# Patient Record
Sex: FEMALE | NOT HISPANIC OR LATINO | URBAN - METROPOLITAN AREA
[De-identification: names, ages, dates, MRNs, and addresses within clinical notes are randomized per-mention and may not be internally consistent; named-entity substitution may affect disease eponyms.]

---

## 2023-01-01 ENCOUNTER — APPOINTMENT (OUTPATIENT)
Dept: PRIMARY CARE | Facility: CLINIC | Age: 68
End: 2023-01-01
Payer: MEDICARE

## 2023-01-01 ENCOUNTER — CLINICAL SUPPORT (OUTPATIENT)
Dept: PRIMARY CARE | Facility: CLINIC | Age: 68
End: 2023-01-01
Payer: MEDICARE

## 2023-01-01 ENCOUNTER — TELEPHONE (OUTPATIENT)
Dept: PRIMARY CARE | Facility: CLINIC | Age: 68
End: 2023-01-01
Payer: MEDICARE

## 2023-01-01 DIAGNOSIS — K21.9 GASTROESOPHAGEAL REFLUX DISEASE WITHOUT ESOPHAGITIS: Primary | ICD-10-CM

## 2023-01-01 DIAGNOSIS — I10 PRIMARY HYPERTENSION: ICD-10-CM

## 2023-01-01 DIAGNOSIS — E11.9 TYPE 2 DIABETES MELLITUS WITHOUT COMPLICATION, WITHOUT LONG-TERM CURRENT USE OF INSULIN (MULTI): Primary | ICD-10-CM

## 2023-01-01 DIAGNOSIS — F32.A DEPRESSION, UNSPECIFIED DEPRESSION TYPE: ICD-10-CM

## 2023-01-01 DIAGNOSIS — E11.9 TYPE 2 DIABETES MELLITUS WITHOUT COMPLICATION, UNSPECIFIED WHETHER LONG TERM INSULIN USE (MULTI): ICD-10-CM

## 2023-01-01 DIAGNOSIS — E78.2 MIXED HYPERLIPIDEMIA: ICD-10-CM

## 2023-01-01 DIAGNOSIS — F51.01 PRIMARY INSOMNIA: ICD-10-CM

## 2023-01-01 LAB — POC HEMOGLOBIN A1C: 5.7 % (ref 4.2–6.5)

## 2023-01-01 RX ORDER — METFORMIN HYDROCHLORIDE 500 MG/1
500 TABLET, EXTENDED RELEASE ORAL 2 TIMES DAILY
Qty: 180 TABLET | Refills: 1 | Status: SHIPPED | OUTPATIENT
Start: 2023-01-01 | End: 2024-01-01

## 2023-01-01 RX ORDER — FUROSEMIDE 20 MG/1
20 TABLET ORAL DAILY
COMMUNITY

## 2023-01-01 RX ORDER — ESOMEPRAZOLE MAGNESIUM 40 MG/1
40 CAPSULE, DELAYED RELEASE ORAL
Qty: 90 CAPSULE | Refills: 1 | Status: SHIPPED | OUTPATIENT
Start: 2023-01-01 | End: 2023-01-01 | Stop reason: WASHOUT

## 2023-01-01 RX ORDER — DULOXETIN HYDROCHLORIDE 60 MG/1
1 CAPSULE, DELAYED RELEASE ORAL DAILY
COMMUNITY
End: 2024-01-01

## 2023-01-01 RX ORDER — TOPIRAMATE 100 MG/1
1 TABLET, FILM COATED ORAL DAILY
COMMUNITY
End: 2024-01-01

## 2023-01-01 RX ORDER — LOSARTAN POTASSIUM 100 MG/1
100 TABLET ORAL DAILY
COMMUNITY
Start: 2015-03-09 | End: 2023-01-01

## 2023-01-01 RX ORDER — LOSARTAN POTASSIUM 100 MG/1
100 TABLET ORAL DAILY
Qty: 90 TABLET | Refills: 1 | Status: SHIPPED | OUTPATIENT
Start: 2023-01-01 | End: 2024-01-01

## 2023-01-01 RX ORDER — METFORMIN HYDROCHLORIDE 500 MG/1
500 TABLET, EXTENDED RELEASE ORAL 2 TIMES DAILY
COMMUNITY
Start: 2019-07-08 | End: 2023-01-01

## 2023-01-01 RX ORDER — ATORVASTATIN CALCIUM 20 MG/1
20 TABLET, FILM COATED ORAL DAILY
COMMUNITY
Start: 2015-09-25 | End: 2023-01-01

## 2023-01-01 RX ORDER — SUMATRIPTAN 50 MG/1
50 TABLET, FILM COATED ORAL 2 TIMES DAILY PRN
COMMUNITY

## 2023-01-01 RX ORDER — TRAZODONE HYDROCHLORIDE 50 MG/1
50 TABLET ORAL NIGHTLY PRN
Qty: 90 TABLET | Refills: 0 | Status: SHIPPED | OUTPATIENT
Start: 2023-01-01 | End: 2024-01-01

## 2023-01-01 RX ORDER — MAGNESIUM HYDROXIDE 400 MG/5ML
1 SUSPENSION, ORAL (FINAL DOSE FORM) ORAL DAILY
COMMUNITY

## 2023-01-01 RX ORDER — ESOMEPRAZOLE MAGNESIUM 40 MG/1
1 CAPSULE, DELAYED RELEASE ORAL
COMMUNITY
End: 2023-01-01 | Stop reason: SDUPTHER

## 2023-01-01 RX ORDER — TRAZODONE HYDROCHLORIDE 50 MG/1
50 TABLET ORAL NIGHTLY PRN
COMMUNITY
Start: 2022-11-02 | End: 2023-01-01 | Stop reason: SDUPTHER

## 2023-01-01 RX ORDER — ARIPIPRAZOLE 2 MG/1
2 TABLET ORAL EVERY 24 HOURS
COMMUNITY
Start: 2023-01-01 | End: 2023-01-01 | Stop reason: SDUPTHER

## 2023-01-01 RX ORDER — ZOLPIDEM TARTRATE 10 MG/1
10 TABLET ORAL NIGHTLY PRN
COMMUNITY
Start: 2010-10-28 | End: 2024-01-01 | Stop reason: ALTCHOICE

## 2023-01-01 RX ORDER — VERAPAMIL HYDROCHLORIDE 360 MG/1
360 CAPSULE, DELAYED RELEASE PELLETS ORAL EVERY MORNING
COMMUNITY
End: 2023-01-01

## 2023-01-01 RX ORDER — ATORVASTATIN CALCIUM 20 MG/1
20 TABLET, FILM COATED ORAL DAILY
Qty: 90 TABLET | Refills: 1 | Status: SHIPPED | OUTPATIENT
Start: 2023-01-01 | End: 2024-01-01

## 2023-01-01 RX ORDER — VERAPAMIL HYDROCHLORIDE 360 MG/1
360 CAPSULE, DELAYED RELEASE PELLETS ORAL EVERY MORNING
Qty: 90 CAPSULE | Refills: 1 | Status: SHIPPED | OUTPATIENT
Start: 2023-01-01 | End: 2024-01-01

## 2023-01-01 RX ORDER — DULOXETIN HYDROCHLORIDE 30 MG/1
1 CAPSULE, DELAYED RELEASE ORAL DAILY
COMMUNITY
Start: 2023-05-11 | End: 2024-01-01 | Stop reason: WASHOUT

## 2023-01-01 RX ORDER — ARIPIPRAZOLE 2 MG/1
2 TABLET ORAL DAILY
Qty: 90 TABLET | Refills: 0 | Status: SHIPPED | OUTPATIENT
Start: 2023-01-01 | End: 2024-01-01

## 2023-01-01 RX ORDER — ARIPIPRAZOLE 2 MG/1
2 TABLET ORAL EVERY 24 HOURS
Qty: 90 TABLET | Refills: 1 | Status: SHIPPED | OUTPATIENT
Start: 2023-01-01 | End: 2023-01-01

## 2023-01-01 RX ORDER — CELECOXIB 200 MG/1
200 CAPSULE ORAL DAILY PRN
COMMUNITY
Start: 2017-07-10 | End: 2024-01-01

## 2023-09-15 PROBLEM — R60.0 EDEMA OF LOWER EXTREMITY: Status: ACTIVE | Noted: 2023-01-01

## 2023-09-15 PROBLEM — E78.00 PURE HYPERCHOLESTEROLEMIA: Status: ACTIVE | Noted: 2023-01-01

## 2023-09-15 PROBLEM — G47.00 INSOMNIA: Status: ACTIVE | Noted: 2023-01-01

## 2023-09-15 PROBLEM — E84.8: Status: ACTIVE | Noted: 2023-01-01

## 2023-09-15 PROBLEM — N95.9 MENOPAUSAL AND POSTMENOPAUSAL DISORDER: Status: ACTIVE | Noted: 2023-01-01

## 2023-09-15 PROBLEM — K21.9 GASTROESOPHAGEAL REFLUX DISEASE: Status: ACTIVE | Noted: 2023-01-01

## 2023-09-15 PROBLEM — G43.009 MIGRAINE WITHOUT AURA AND WITHOUT STATUS MIGRAINOSUS, NOT INTRACTABLE: Status: ACTIVE | Noted: 2023-01-01

## 2023-09-15 PROBLEM — M19.90 DEGENERATIVE JOINT DISEASE: Status: ACTIVE | Noted: 2023-01-01

## 2023-09-15 PROBLEM — E03.9 HYPOTHYROIDISM: Status: ACTIVE | Noted: 2023-01-01

## 2023-09-15 PROBLEM — E66.01 MORBID OBESITY (MULTI): Status: ACTIVE | Noted: 2023-01-01

## 2023-09-15 PROBLEM — F41.8 MIXED ANXIETY DEPRESSIVE DISORDER: Status: ACTIVE | Noted: 2023-01-01

## 2023-09-15 PROBLEM — E11.9 TYPE 2 DIABETES MELLITUS WITHOUT COMPLICATION (MULTI): Status: ACTIVE | Noted: 2023-01-01

## 2023-09-15 PROBLEM — D50.9 IRON DEFICIENCY ANEMIA: Status: ACTIVE | Noted: 2023-01-01

## 2023-09-15 PROBLEM — E55.9 VITAMIN D DEFICIENCY: Status: ACTIVE | Noted: 2023-01-01

## 2023-09-15 PROBLEM — I10 ESSENTIAL HYPERTENSION: Status: ACTIVE | Noted: 2023-01-01

## 2023-09-15 PROBLEM — R53.82 CHRONIC FATIGUE: Status: ACTIVE | Noted: 2023-01-01

## 2023-09-15 PROBLEM — M79.7 FIBROMYALGIA: Status: ACTIVE | Noted: 2023-01-01

## 2023-09-15 PROBLEM — G47.30 SLEEP APNEA: Status: ACTIVE | Noted: 2023-01-01

## 2023-09-15 PROBLEM — L71.9 ROSACEA: Status: ACTIVE | Noted: 2023-01-01

## 2023-12-15 NOTE — TELEPHONE ENCOUNTER
From ans machine 12/15/23 @12:59pm:  Insurance is not covering Abilify 2 mg.  But they will approve the generic form Aripiprazole.  Please send rx to [facility] for this.

## 2024-01-01 ENCOUNTER — APPOINTMENT (OUTPATIENT)
Dept: CARDIOLOGY | Facility: HOSPITAL | Age: 69
End: 2024-01-01
Payer: MEDICARE

## 2024-01-01 ENCOUNTER — OFFICE VISIT (OUTPATIENT)
Dept: PRIMARY CARE | Facility: CLINIC | Age: 69
End: 2024-01-01
Payer: MEDICARE

## 2024-01-01 ENCOUNTER — APPOINTMENT (OUTPATIENT)
Dept: RADIOLOGY | Facility: HOSPITAL | Age: 69
End: 2024-01-01
Payer: MEDICARE

## 2024-01-01 ENCOUNTER — HOSPITAL ENCOUNTER (OUTPATIENT)
Dept: RADIOLOGY | Facility: CLINIC | Age: 69
Discharge: HOME | End: 2024-05-29
Payer: MEDICARE

## 2024-01-01 ENCOUNTER — LAB (OUTPATIENT)
Dept: LAB | Facility: LAB | Age: 69
End: 2024-01-01
Payer: MEDICARE

## 2024-01-01 ENCOUNTER — HOSPITAL ENCOUNTER (EMERGENCY)
Facility: HOSPITAL | Age: 69
End: 2024-06-01
Attending: STUDENT IN AN ORGANIZED HEALTH CARE EDUCATION/TRAINING PROGRAM
Payer: MEDICARE

## 2024-01-01 ENCOUNTER — PATIENT MESSAGE (OUTPATIENT)
Dept: PRIMARY CARE | Facility: CLINIC | Age: 69
End: 2024-01-01
Payer: MEDICARE

## 2024-01-01 VITALS
DIASTOLIC BLOOD PRESSURE: 81 MMHG | HEART RATE: 132 BPM | SYSTOLIC BLOOD PRESSURE: 105 MMHG | HEIGHT: 65 IN | RESPIRATION RATE: 20 BRPM | WEIGHT: 286.6 LBS | BODY MASS INDEX: 47.75 KG/M2

## 2024-01-01 VITALS
HEART RATE: 71 BPM | HEIGHT: 62 IN | WEIGHT: 271 LBS | OXYGEN SATURATION: 96 % | SYSTOLIC BLOOD PRESSURE: 124 MMHG | BODY MASS INDEX: 49.87 KG/M2 | DIASTOLIC BLOOD PRESSURE: 88 MMHG

## 2024-01-01 DIAGNOSIS — F33.9 DEPRESSION, RECURRENT (CMS-HCC): ICD-10-CM

## 2024-01-01 DIAGNOSIS — D50.8 IRON DEFICIENCY ANEMIA SECONDARY TO INADEQUATE DIETARY IRON INTAKE: ICD-10-CM

## 2024-01-01 DIAGNOSIS — E55.9 VITAMIN D DEFICIENCY: ICD-10-CM

## 2024-01-01 DIAGNOSIS — E66.01 MORBID OBESITY (MULTI): Primary | ICD-10-CM

## 2024-01-01 DIAGNOSIS — I10 ESSENTIAL HYPERTENSION: ICD-10-CM

## 2024-01-01 DIAGNOSIS — M79.7 FIBROMYALGIA: Primary | ICD-10-CM

## 2024-01-01 DIAGNOSIS — R06.02 SOB (SHORTNESS OF BREATH) ON EXERTION: ICD-10-CM

## 2024-01-01 DIAGNOSIS — E03.9 ACQUIRED HYPOTHYROIDISM: ICD-10-CM

## 2024-01-01 DIAGNOSIS — E11.9 TYPE 2 DIABETES MELLITUS WITHOUT COMPLICATION, WITHOUT LONG-TERM CURRENT USE OF INSULIN (MULTI): ICD-10-CM

## 2024-01-01 DIAGNOSIS — N95.9 MENOPAUSAL AND POSTMENOPAUSAL DISORDER: ICD-10-CM

## 2024-01-01 DIAGNOSIS — G43.009 MIGRAINE WITHOUT AURA AND WITHOUT STATUS MIGRAINOSUS, NOT INTRACTABLE: Primary | ICD-10-CM

## 2024-01-01 DIAGNOSIS — Z00.00 ROUTINE GENERAL MEDICAL EXAMINATION AT HEALTH CARE FACILITY: ICD-10-CM

## 2024-01-01 DIAGNOSIS — I21.3 ST ELEVATION MYOCARDIAL INFARCTION (STEMI), UNSPECIFIED ARTERY (MULTI): ICD-10-CM

## 2024-01-01 DIAGNOSIS — I51.7 CARDIOMEGALY: Primary | ICD-10-CM

## 2024-01-01 DIAGNOSIS — I51.7 CARDIOMEGALY: ICD-10-CM

## 2024-01-01 DIAGNOSIS — F51.01 PRIMARY INSOMNIA: ICD-10-CM

## 2024-01-01 DIAGNOSIS — F32.A DEPRESSION, UNSPECIFIED DEPRESSION TYPE: ICD-10-CM

## 2024-01-01 DIAGNOSIS — J96.01 ACUTE RESPIRATORY FAILURE WITH HYPOXIA (MULTI): ICD-10-CM

## 2024-01-01 DIAGNOSIS — E78.00 PURE HYPERCHOLESTEROLEMIA: ICD-10-CM

## 2024-01-01 DIAGNOSIS — F41.8 MIXED ANXIETY DEPRESSIVE DISORDER: ICD-10-CM

## 2024-01-01 DIAGNOSIS — K21.9 GASTROESOPHAGEAL REFLUX DISEASE WITHOUT ESOPHAGITIS: ICD-10-CM

## 2024-01-01 DIAGNOSIS — Z00.00 MEDICARE ANNUAL WELLNESS VISIT, SUBSEQUENT: Primary | ICD-10-CM

## 2024-01-01 DIAGNOSIS — Z91.89 AT RISK FOR DEPRESSION: ICD-10-CM

## 2024-01-01 DIAGNOSIS — Z12.31 SCREENING MAMMOGRAM FOR BREAST CANCER: ICD-10-CM

## 2024-01-01 DIAGNOSIS — I46.9 CARDIAC ARREST (MULTI): ICD-10-CM

## 2024-01-01 DIAGNOSIS — E84.8: ICD-10-CM

## 2024-01-01 DIAGNOSIS — R73.01 ELEVATED FASTING GLUCOSE: ICD-10-CM

## 2024-01-01 DIAGNOSIS — E66.01 MORBID OBESITY (MULTI): ICD-10-CM

## 2024-01-01 DIAGNOSIS — Z12.11 SCREENING FOR MALIGNANT NEOPLASM OF COLON: ICD-10-CM

## 2024-01-01 DIAGNOSIS — I21.3 STEMI (ST ELEVATION MYOCARDIAL INFARCTION) (MULTI): Primary | ICD-10-CM

## 2024-01-01 DIAGNOSIS — D72.829 LEUKOCYTOSIS, UNSPECIFIED TYPE: Primary | ICD-10-CM

## 2024-01-01 LAB
25(OH)D3 SERPL-MCNC: 27 NG/ML (ref 31–100)
ALBUMIN SERPL-MCNC: 4.1 G/DL (ref 3.5–5)
ALBUMIN SERPL-MCNC: 4.3 G/DL (ref 3.5–5)
ALP BLD-CCNC: 128 U/L (ref 35–125)
ALP BLD-CCNC: 134 U/L (ref 35–125)
ALT SERPL-CCNC: 13 U/L (ref 5–40)
ALT SERPL-CCNC: 17 U/L (ref 5–40)
ANION GAP BLDA CALCULATED.4IONS-SCNC: 24 MMO/L (ref 10–25)
ANION GAP SERPL CALC-SCNC: 11 MMOL/L
ANION GAP SERPL CALC-SCNC: >19 MMOL/L
APPARATUS: ABNORMAL
ARTERIAL PATENCY WRIST A: POSITIVE
AST SERPL-CCNC: 17 U/L (ref 5–40)
AST SERPL-CCNC: 24 U/L (ref 5–40)
BASE EXCESS BLDA CALC-SCNC: -18.9 MMOL/L (ref -2–3)
BASOPHILS # BLD AUTO: 0.11 X10*3/UL (ref 0–0.1)
BASOPHILS # BLD MANUAL: 0 X10*3/UL (ref 0–0.1)
BASOPHILS NFR BLD AUTO: 1 %
BASOPHILS NFR BLD MANUAL: 0 %
BILIRUB SERPL-MCNC: 1 MG/DL (ref 0.1–1.2)
BILIRUB SERPL-MCNC: 1.1 MG/DL (ref 0.1–1.2)
BODY TEMPERATURE: 37 DEGREES CELSIUS
BUN SERPL-MCNC: 20 MG/DL (ref 8–25)
BUN SERPL-MCNC: 20 MG/DL (ref 8–25)
BURR CELLS BLD QL SMEAR: ABNORMAL
CA-I BLDA-SCNC: 1.27 MMOL/L (ref 1.1–1.33)
CALCIUM SERPL-MCNC: 9.1 MG/DL (ref 8.5–10.4)
CALCIUM SERPL-MCNC: 9.5 MG/DL (ref 8.5–10.4)
CHLORIDE BLDA-SCNC: 106 MMOL/L (ref 98–107)
CHLORIDE SERPL-SCNC: 104 MMOL/L (ref 97–107)
CHLORIDE SERPL-SCNC: 106 MMOL/L (ref 97–107)
CHOLEST SERPL-MCNC: 143 MG/DL (ref 133–200)
CHOLEST/HDLC SERPL: 2.7 {RATIO}
CO2 SERPL-SCNC: 15 MMOL/L (ref 24–31)
CO2 SERPL-SCNC: 23 MMOL/L (ref 24–31)
CREAT SERPL-MCNC: 1 MG/DL (ref 0.4–1.6)
CREAT SERPL-MCNC: 1.4 MG/DL (ref 0.4–1.6)
DACRYOCYTES BLD QL SMEAR: NORMAL
EGFRCR SERPLBLD CKD-EPI 2021: 41 ML/MIN/1.73M*2
EGFRCR SERPLBLD CKD-EPI 2021: 61 ML/MIN/1.73M*2
EOSINOPHIL # BLD AUTO: 0.35 X10*3/UL (ref 0–0.7)
EOSINOPHIL # BLD MANUAL: 0.2 X10*3/UL (ref 0–0.7)
EOSINOPHIL NFR BLD AUTO: 3 %
EOSINOPHIL NFR BLD MANUAL: 1 %
EPAP CMH2O: 5 CM H2O
ERYTHROCYTE [DISTWIDTH] IN BLOOD BY AUTOMATED COUNT: 19.9 % (ref 11.5–14.5)
ERYTHROCYTE [DISTWIDTH] IN BLOOD BY AUTOMATED COUNT: 21.1 % (ref 11.5–14.5)
EST. AVERAGE GLUCOSE BLD GHB EST-MCNC: 105 MG/DL
GIANT PLATELETS BLD QL SMEAR: NORMAL
GLUCOSE BLD MANUAL STRIP-MCNC: 272 MG/DL (ref 74–99)
GLUCOSE BLDA-MCNC: 481 MG/DL (ref 74–99)
GLUCOSE SERPL-MCNC: 147 MG/DL (ref 65–99)
GLUCOSE SERPL-MCNC: 402 MG/DL (ref 65–99)
HBA1C MFR BLD: 5.3 %
HCO3 BLDA-SCNC: 11.9 MMOL/L (ref 22–26)
HCT VFR BLD AUTO: 37.5 % (ref 36–46)
HCT VFR BLD AUTO: 40.2 % (ref 36–46)
HCT VFR BLD EST: 37 % (ref 36–46)
HDLC SERPL-MCNC: 53 MG/DL
HGB BLD-MCNC: 11.6 G/DL (ref 12–16)
HGB BLD-MCNC: 12 G/DL (ref 12–16)
HGB BLDA-MCNC: 12.4 G/DL (ref 12–16)
IMM GRANULOCYTES # BLD AUTO: 0.09 X10*3/UL (ref 0–0.7)
IMM GRANULOCYTES # BLD AUTO: 0.32 X10*3/UL (ref 0–0.7)
IMM GRANULOCYTES NFR BLD AUTO: 0.8 % (ref 0–0.9)
IMM GRANULOCYTES NFR BLD AUTO: 1.6 % (ref 0–0.9)
INHALED O2 CONCENTRATION: 100 %
IPAP CMH2O: 15 CM H2O
LACTATE BLDA-SCNC: 12 MMOL/L (ref 0.4–2)
LDLC SERPL CALC-MCNC: 67 MG/DL (ref 65–130)
LYMPHOCYTES # BLD AUTO: 2.52 X10*3/UL (ref 1.2–4.8)
LYMPHOCYTES # BLD MANUAL: 6.43 X10*3/UL (ref 1.2–4.8)
LYMPHOCYTES NFR BLD AUTO: 21.8 %
LYMPHOCYTES NFR BLD MANUAL: 32 %
MAGNESIUM SERPL-MCNC: 2.6 MG/DL (ref 1.6–3.1)
MCH RBC QN AUTO: 31.9 PG (ref 26–34)
MCH RBC QN AUTO: 32.4 PG (ref 26–34)
MCHC RBC AUTO-ENTMCNC: 29.9 G/DL (ref 32–36)
MCHC RBC AUTO-ENTMCNC: 30.9 G/DL (ref 32–36)
MCV RBC AUTO: 103 FL (ref 80–100)
MCV RBC AUTO: 109 FL (ref 80–100)
METAMYELOCYTES # BLD MANUAL: 0.2 X10*3/UL
METAMYELOCYTES NFR BLD MANUAL: 1 %
MONOCYTES # BLD AUTO: 0.96 X10*3/UL (ref 0.1–1)
MONOCYTES # BLD MANUAL: 2.01 X10*3/UL (ref 0.1–1)
MONOCYTES NFR BLD AUTO: 8.3 %
MONOCYTES NFR BLD MANUAL: 10 %
NEUTROPHILS # BLD AUTO: 7.54 X10*3/UL (ref 1.2–7.7)
NEUTROPHILS NFR BLD AUTO: 65.1 %
NEUTS SEG # BLD MANUAL: 10.85 X10*3/UL (ref 1.2–7)
NEUTS SEG NFR BLD MANUAL: 54 %
NRBC BLD MANUAL-RTO: 2 % (ref 0–0)
NRBC BLD-RTO: 0.9 /100 WBCS (ref 0–0)
NRBC BLD-RTO: 3.2 /100 WBCS (ref 0–0)
NT-PROBNP SERPL-MCNC: ABNORMAL PG/ML (ref 0–353)
OVALOCYTES BLD QL SMEAR: ABNORMAL
OVALOCYTES BLD QL SMEAR: NORMAL
OXYHGB MFR BLDA: 97.2 % (ref 94–98)
PCO2 BLDA: 47 MM HG (ref 38–42)
PH BLDA: 7.01 PH (ref 7.38–7.42)
PLATELET # BLD AUTO: 339 X10*3/UL (ref 150–450)
PLATELET # BLD AUTO: 343 X10*3/UL (ref 150–450)
PO2 BLDA: 202 MM HG (ref 85–95)
POLYCHROMASIA BLD QL SMEAR: ABNORMAL
POLYCHROMASIA BLD QL SMEAR: NORMAL
POTASSIUM BLDA-SCNC: 4.9 MMOL/L (ref 3.5–5.3)
POTASSIUM SERPL-SCNC: 4.1 MMOL/L (ref 3.4–5.1)
POTASSIUM SERPL-SCNC: 4.5 MMOL/L (ref 3.4–5.1)
PROT SERPL-MCNC: 6.9 G/DL (ref 5.9–7.9)
PROT SERPL-MCNC: 7.1 G/DL (ref 5.9–7.9)
RBC # BLD AUTO: 3.64 X10*6/UL (ref 4–5.2)
RBC # BLD AUTO: 3.7 X10*6/UL (ref 4–5.2)
RBC MORPH BLD: ABNORMAL
RBC MORPH BLD: NORMAL
SAO2 % BLDA: 99 % (ref 94–100)
SODIUM BLDA-SCNC: 137 MMOL/L (ref 136–145)
SODIUM SERPL-SCNC: 140 MMOL/L (ref 133–145)
SODIUM SERPL-SCNC: 140 MMOL/L (ref 133–145)
SPECIMEN DRAWN FROM PATIENT: ABNORMAL
SPHEROCYTES BLD QL SMEAR: ABNORMAL
SPHEROCYTES BLD QL SMEAR: NORMAL
TOTAL CELLS COUNTED BLD: 100
TRIGL SERPL-MCNC: 113 MG/DL (ref 40–150)
TROPONIN T SERPL-MCNC: 52 NG/L
TSH SERPL DL<=0.05 MIU/L-ACNC: 1.41 MIU/L (ref 0.27–4.2)
VARIANT LYMPHS # BLD MANUAL: 0.4 X10*3/UL (ref 0–0.5)
VARIANT LYMPHS NFR BLD: 2 %
WBC # BLD AUTO: 11.6 X10*3/UL (ref 4.4–11.3)
WBC # BLD AUTO: 20.1 X10*3/UL (ref 4.4–11.3)

## 2024-01-01 PROCEDURE — 82947 ASSAY GLUCOSE BLOOD QUANT: CPT

## 2024-01-01 PROCEDURE — 84443 ASSAY THYROID STIM HORMONE: CPT

## 2024-01-01 PROCEDURE — G0439 PPPS, SUBSEQ VISIT: HCPCS

## 2024-01-01 PROCEDURE — 99285 EMERGENCY DEPT VISIT HI MDM: CPT | Mod: 25

## 2024-01-01 PROCEDURE — 71045 X-RAY EXAM CHEST 1 VIEW: CPT | Mod: 59

## 2024-01-01 PROCEDURE — 99497 ADVNCD CARE PLAN 30 MIN: CPT

## 2024-01-01 PROCEDURE — 71046 X-RAY EXAM CHEST 2 VIEWS: CPT | Performed by: RADIOLOGY

## 2024-01-01 PROCEDURE — 71045 X-RAY EXAM CHEST 1 VIEW: CPT | Performed by: RADIOLOGY

## 2024-01-01 PROCEDURE — 84132 ASSAY OF SERUM POTASSIUM: CPT | Mod: 91 | Performed by: STUDENT IN AN ORGANIZED HEALTH CARE EDUCATION/TRAINING PROGRAM

## 2024-01-01 PROCEDURE — 2500000005 HC RX 250 GENERAL PHARMACY W/O HCPCS: Performed by: STUDENT IN AN ORGANIZED HEALTH CARE EDUCATION/TRAINING PROGRAM

## 2024-01-01 PROCEDURE — 1157F ADVNC CARE PLAN IN RCRD: CPT

## 2024-01-01 PROCEDURE — 31500 INSERT EMERGENCY AIRWAY: CPT

## 2024-01-01 PROCEDURE — 92950 HEART/LUNG RESUSCITATION CPR: CPT

## 2024-01-01 PROCEDURE — 36600 WITHDRAWAL OF ARTERIAL BLOOD: CPT

## 2024-01-01 PROCEDURE — 99291 CRITICAL CARE FIRST HOUR: CPT | Mod: 25

## 2024-01-01 PROCEDURE — 36415 COLL VENOUS BLD VENIPUNCTURE: CPT

## 2024-01-01 PROCEDURE — 1036F TOBACCO NON-USER: CPT

## 2024-01-01 PROCEDURE — 1170F FXNL STATUS ASSESSED: CPT

## 2024-01-01 PROCEDURE — 99215 OFFICE O/P EST HI 40 MIN: CPT

## 2024-01-01 PROCEDURE — 80053 COMPREHEN METABOLIC PANEL: CPT

## 2024-01-01 PROCEDURE — 85007 BL SMEAR W/DIFF WBC COUNT: CPT | Performed by: STUDENT IN AN ORGANIZED HEALTH CARE EDUCATION/TRAINING PROGRAM

## 2024-01-01 PROCEDURE — 3074F SYST BP LT 130 MM HG: CPT

## 2024-01-01 PROCEDURE — 31500 INSERT EMERGENCY AIRWAY: CPT | Performed by: STUDENT IN AN ORGANIZED HEALTH CARE EDUCATION/TRAINING PROGRAM

## 2024-01-01 PROCEDURE — 36415 COLL VENOUS BLD VENIPUNCTURE: CPT | Performed by: STUDENT IN AN ORGANIZED HEALTH CARE EDUCATION/TRAINING PROGRAM

## 2024-01-01 PROCEDURE — 1160F RVW MEDS BY RX/DR IN RCRD: CPT

## 2024-01-01 PROCEDURE — 83036 HEMOGLOBIN GLYCOSYLATED A1C: CPT

## 2024-01-01 PROCEDURE — 1125F AMNT PAIN NOTED PAIN PRSNT: CPT

## 2024-01-01 PROCEDURE — 94660 CPAP INITIATION&MGMT: CPT

## 2024-01-01 PROCEDURE — 82306 VITAMIN D 25 HYDROXY: CPT

## 2024-01-01 PROCEDURE — 71046 X-RAY EXAM CHEST 2 VIEWS: CPT

## 2024-01-01 PROCEDURE — 85025 COMPLETE CBC W/AUTO DIFF WBC: CPT

## 2024-01-01 PROCEDURE — 84484 ASSAY OF TROPONIN QUANT: CPT | Performed by: STUDENT IN AN ORGANIZED HEALTH CARE EDUCATION/TRAINING PROGRAM

## 2024-01-01 PROCEDURE — 85027 COMPLETE CBC AUTOMATED: CPT | Performed by: STUDENT IN AN ORGANIZED HEALTH CARE EDUCATION/TRAINING PROGRAM

## 2024-01-01 PROCEDURE — 1159F MED LIST DOCD IN RCRD: CPT

## 2024-01-01 PROCEDURE — 83880 ASSAY OF NATRIURETIC PEPTIDE: CPT | Performed by: STUDENT IN AN ORGANIZED HEALTH CARE EDUCATION/TRAINING PROGRAM

## 2024-01-01 PROCEDURE — 93005 ELECTROCARDIOGRAM TRACING: CPT | Mod: 59

## 2024-01-01 PROCEDURE — 80061 LIPID PANEL: CPT

## 2024-01-01 PROCEDURE — 3079F DIAST BP 80-89 MM HG: CPT

## 2024-01-01 PROCEDURE — 83735 ASSAY OF MAGNESIUM: CPT | Performed by: STUDENT IN AN ORGANIZED HEALTH CARE EDUCATION/TRAINING PROGRAM

## 2024-01-01 RX ORDER — CELECOXIB 200 MG/1
CAPSULE ORAL
Qty: 90 CAPSULE | Refills: 1 | Status: SHIPPED | OUTPATIENT
Start: 2024-01-01 | End: 2024-06-02

## 2024-01-01 RX ORDER — TRAZODONE HYDROCHLORIDE 50 MG/1
TABLET ORAL
Qty: 90 TABLET | Refills: 0 | Status: SHIPPED | OUTPATIENT
Start: 2024-01-01 | End: 2024-01-01

## 2024-01-01 RX ORDER — DULOXETIN HYDROCHLORIDE 60 MG/1
60 CAPSULE, DELAYED RELEASE ORAL 2 TIMES DAILY
Qty: 180 CAPSULE | Refills: 1 | Status: SHIPPED | OUTPATIENT
Start: 2024-01-01 | End: 2024-06-02

## 2024-01-01 RX ORDER — TRAZODONE HYDROCHLORIDE 50 MG/1
TABLET ORAL
Qty: 90 TABLET | Refills: 0 | Status: SHIPPED | OUTPATIENT
Start: 2024-01-01 | End: 2024-06-02

## 2024-01-01 RX ORDER — HEPARIN SODIUM 5000 [USP'U]/ML
5000 INJECTION, SOLUTION INTRAVENOUS; SUBCUTANEOUS ONCE
Status: DISCONTINUED | OUTPATIENT
Start: 2024-01-01 | End: 2024-01-01 | Stop reason: HOSPADM

## 2024-01-01 RX ORDER — EPINEPHRINE 0.1 MG/ML
INJECTION INTRACARDIAC; INTRAVENOUS
Status: DISCONTINUED
Start: 2024-01-01 | End: 2024-01-01 | Stop reason: HOSPADM

## 2024-01-01 RX ORDER — TOPIRAMATE 100 MG/1
100 TABLET, FILM COATED ORAL DAILY
Qty: 90 TABLET | Refills: 1 | Status: SHIPPED | OUTPATIENT
Start: 2024-01-01 | End: 2024-06-02

## 2024-01-01 RX ORDER — ARIPIPRAZOLE 2 MG/1
2 TABLET ORAL DAILY
Qty: 90 TABLET | Refills: 1 | Status: SHIPPED | OUTPATIENT
Start: 2024-01-01 | End: 2024-06-02

## 2024-01-01 RX ORDER — NOREPINEPHRINE BITARTRATE/D5W 8 MG/250ML
PLASTIC BAG, INJECTION (ML) INTRAVENOUS
Status: DISCONTINUED
Start: 2024-01-01 | End: 2024-01-01 | Stop reason: HOSPADM

## 2024-01-01 RX ADMIN — Medication 100 PERCENT: at 11:10

## 2024-01-01 ASSESSMENT — PATIENT HEALTH QUESTIONNAIRE - PHQ9
5. POOR APPETITE OR OVEREATING: NEARLY EVERY DAY
1. LITTLE INTEREST OR PLEASURE IN DOING THINGS: NEARLY EVERY DAY
SUM OF ALL RESPONSES TO PHQ9 QUESTIONS 1 AND 2: 6
SUM OF ALL RESPONSES TO PHQ QUESTIONS 1-9: 20
4. FEELING TIRED OR HAVING LITTLE ENERGY: NEARLY EVERY DAY
6. FEELING BAD ABOUT YOURSELF - OR THAT YOU ARE A FAILURE OR HAVE LET YOURSELF OR YOUR FAMILY DOWN: NEARLY EVERY DAY
7. TROUBLE CONCENTRATING ON THINGS, SUCH AS READING THE NEWSPAPER OR WATCHING TELEVISION: MORE THAN HALF THE DAYS
9. THOUGHTS THAT YOU WOULD BE BETTER OFF DEAD, OR OF HURTING YOURSELF: NOT AT ALL
3. TROUBLE FALLING OR STAYING ASLEEP OR SLEEPING TOO MUCH: NEARLY EVERY DAY
8. MOVING OR SPEAKING SO SLOWLY THAT OTHER PEOPLE COULD HAVE NOTICED. OR THE OPPOSITE, BEING SO FIGETY OR RESTLESS THAT YOU HAVE BEEN MOVING AROUND A LOT MORE THAN USUAL: NOT AT ALL
2. FEELING DOWN, DEPRESSED OR HOPELESS: NEARLY EVERY DAY

## 2024-01-01 ASSESSMENT — LIFESTYLE VARIABLES
AUDIT-C TOTAL SCORE: 1
SKIP TO QUESTIONS 9-10: 1
HOW MANY STANDARD DRINKS CONTAINING ALCOHOL DO YOU HAVE ON A TYPICAL DAY: PATIENT DOES NOT DRINK
HOW OFTEN DO YOU HAVE A DRINK CONTAINING ALCOHOL: MONTHLY OR LESS
HOW OFTEN DO YOU HAVE SIX OR MORE DRINKS ON ONE OCCASION: NEVER

## 2024-01-01 ASSESSMENT — ACTIVITIES OF DAILY LIVING (ADL)
GROCERY_SHOPPING: NEEDS ASSISTANCE
TAKING_MEDICATION: INDEPENDENT
BATHING: INDEPENDENT
DOING_HOUSEWORK: INDEPENDENT
MANAGING_FINANCES: INDEPENDENT
DRESSING: INDEPENDENT

## 2024-01-01 ASSESSMENT — PAIN SCALES - GENERAL: PAINLEVEL: 7

## 2024-01-01 ASSESSMENT — ENCOUNTER SYMPTOMS
OCCASIONAL FEELINGS OF UNSTEADINESS: 0
DEPRESSION: 0
LOSS OF SENSATION IN FEET: 0

## 2024-05-15 PROBLEM — F33.9 DEPRESSION, RECURRENT (CMS-HCC): Status: ACTIVE | Noted: 2024-01-01

## 2024-05-15 NOTE — PROGRESS NOTES
Subjective   Patient ID: Marissa Reyna is a 69 y.o. female who presents for Annual Exam.    HPI   Patient denies any falls, urgent care, ER, hospitalization, new diagnoses, surgeries in the past year.  Denies any issues with chest pain, chest pressure, constipation, , blood in stool, urinary urgency, frequency, blood in urine, muscle weakness in arms and legs, numbness or tingling in fingers or toes.  Admits to diarrhea shortness of breath with exertion she did have COVID in October and since then she notices shortness of breath with exertion  She is not checking her blood sugars or blood pressures.  Has complaints since COVID was a randomly small body odor, smoking, dog feces when there is none around  Review of Systems  Review of Systems negative except as noted in HPI and Chief complaint.    Current Outpatient Medications:     ARIPiprazole (Abilify) 2 mg tablet, TAKE 1 TABLET DAILY, Disp: 90 tablet, Rfl: 1    celecoxib (CeleBREX) 200 mg capsule, TAKE 1 CAPSULE DAILY WITH FOOD AS NEEDED, Disp: 90 capsule, Rfl: 1    DULoxetine (Cymbalta) 60 mg DR capsule, TAKE 1 CAPSULE TWICE A DAY, Disp: 180 capsule, Rfl: 1    furosemide (Lasix) 20 mg tablet, Take 1 tablet (20 mg) by mouth once daily., Disp: , Rfl:     glucosamine-chondroit-vit C-Mn (Glucosamine-Chondroitin Complx) capsule, Take 1 capsule by mouth once daily., Disp: , Rfl:     multivitamin (MULTIPLE VITAMINS ORAL), Take 1 tablet by mouth once daily., Disp: , Rfl:     SUMAtriptan (Imitrex) 50 mg tablet, Take 1 tablet (50 mg) by mouth 2 times a day as needed., Disp: , Rfl:     topiramate (Topamax) 100 mg tablet, TAKE 1 TABLET DAILY, Disp: 90 tablet, Rfl: 1    traZODone (Desyrel) 50 mg tablet, TAKE 1 TABLET AT BEDTIME AS NEEDED FOR DEPRESSION, Disp: 90 tablet, Rfl: 0    atorvastatin (Lipitor) 20 mg tablet, Take 1 tablet (20 mg) by mouth once daily., Disp: 90 tablet, Rfl: 1    losartan (Cozaar) 100 mg tablet, Take 1 tablet (100 mg) by mouth once daily., Disp: 90  "tablet, Rfl: 1    metFORMIN  mg 24 hr tablet, Take 1 tablet (500 mg) by mouth 2 times a day., Disp: 180 tablet, Rfl: 1    verapamil ER (Veralan PM) 360 mg 24 hr capsule, Take 1 capsule (360 mg) by mouth once daily in the morning., Disp: 90 capsule, Rfl: 1    Objective   /88   Pulse 71   Ht 1.575 m (5' 2\")   Wt 123 kg (271 lb)   SpO2 96%   BMI 49.57 kg/m²     Physical Exam  Vitals reviewed.   Constitutional:       General: She is not in acute distress.     Appearance: Normal appearance.   HENT:      Head: Normocephalic.      Right Ear: Tympanic membrane normal.      Left Ear: Tympanic membrane normal.      Nose: Nose normal.      Mouth/Throat:      Mouth: Mucous membranes are moist.      Pharynx: Oropharynx is clear.   Eyes:      Extraocular Movements: Extraocular movements intact.      Conjunctiva/sclera: Conjunctivae normal.      Pupils: Pupils are equal, round, and reactive to light.   Cardiovascular:      Rate and Rhythm: Normal rate.      Pulses: Normal pulses.      Heart sounds: Normal heart sounds.   Pulmonary:      Effort: Pulmonary effort is normal.      Breath sounds: Normal breath sounds.   Abdominal:      General: Abdomen is flat. Bowel sounds are normal.      Palpations: Abdomen is soft.   Musculoskeletal:         General: Normal range of motion.   Skin:     General: Skin is warm and dry.      Capillary Refill: Capillary refill takes 2 to 3 seconds.   Neurological:      General: No focal deficit present.      Mental Status: She is alert and oriented to person, place, and time. Mental status is at baseline.   Psychiatric:         Mood and Affect: Mood normal.         Behavior: Behavior is cooperative.         Assessment/Plan   Diagnoses and all orders for this visit:  Medicare annual wellness visit, subsequent  At risk for depression  Arthralgia associated with cystic fibrosis (Multi)  Morbid obesity (Multi)  Pure hypercholesterolemia  -     Comprehensive Metabolic Panel; Future  -     " CBC and Auto Differential; Future  -     Lipid Panel; Future  Essential hypertension  -     Comprehensive Metabolic Panel; Future  -     CBC and Auto Differential; Future  Vitamin D deficiency  -     Vitamin D 25-Hydroxy,Total (for eval of Vitamin D levels); Future  Type 2 diabetes mellitus without complication, without long-term current use of insulin (Multi)  -     Hemoglobin A1C; Future  -     Albumin , Urine Random; Future  Acquired hypothyroidism  -     TSH with reflex to Free T4 if abnormal; Future  Gastroesophageal reflux disease without esophagitis  Menopausal and postmenopausal disorder  -     XR DEXA bone density; Future  Iron deficiency anemia secondary to inadequate dietary iron intake  Mixed anxiety depressive disorder  Screening mammogram for breast cancer  -     BI mammo bilateral screening tomosynthesis; Future  Screening for malignant neoplasm of colon  -     Colonoscopy Screening; Average Risk Patient; Future  Depression, recurrent (CMS-HCC)  SOB (shortness of breath) on exertion  -     XR chest 2 views; Future  -     Referral to [facility]; Future  Routine general medical examination at health care facility      HYPERLIPIDEMIA:  Decrease intake of saturated fats, fast food, sweets.  Increase intake of fresh fruit fresh vegetables and lean meats.  Increase healthy fats seeds, nuts, olive oil instead of butter.  walk 150 minutes/week for heart health.  HYPERTENSION:   Decrease intake of processed foods, fast foods, lunch meat, canned soups, canned veggies.  Increase intake of fresh fruits, veggies, and lean meats. Monitor blood pressure at home, keep a log and bring this with you to your next appointment. Call the office if your blood pressure runs 150/90 or higher consistently.  Blood Pressure Technique:  Sit quietly in a chair for 5 minutes with back supported and feet on the floor  Then place left arm on a table or armrest so bicep area is at the same level of heart or left  breast  Check three times in a row, disregard the highest reading and average the other two  DM2  Continue current medication.  We did discuss adding Ozempic patient will have colonoscopy done first  Continue work on diet - recommend lots of fruits and vegetables, lean protein like chicken, turkey, fish, beans and Greek yogurt. Try to choose healthier carbohydrate options like oatmeal, wheat bread and pasta, sweet potatoes. Limit sugary treats.  Check a fasting sugar first thing in the AM once daily and keep a log of the results to bring to your next office visit.  Please contact office if your sugars are consistently >140.  Reevaluate in 3 months.   LAB Order/ BLOOD TESTS   I have ordered lab work for you to get done. This should be fasting. Nothing to eat or drink after midnight besides black tea,  black coffee, or water. If you do not hear from this office within two days of having your labs done, please call for your results.   I have ordered a bone density test to be done when you can.  We will call the results.  MAMMOGRAM:  I have ordered you a mammogram to be done as soon as you are able.  We will call the results.  Feet:  socks removed, no foot pain reported, no deformities, ulcers, calluses and sensitive to 10 GM monofilament    Advanced care planning was discussed with Marissa Reyna today. We reviewed code status, Medical Power of , and Living will. does not have LW or POA.   *This note was dictated using DRAGON speech recognition software and was corrected for spelling or grammatical errors, but despite proofreading several typographical errors might be present that might affect the meaning of the content.    Elaine Kahn CNP    Time Spent  Prep time on day of patient encounter: 5 minutes  Time spent directly with patient, family or caregiver: 25 minutes  Documentation Time: 5 minutes

## 2024-05-15 NOTE — PATIENT INSTRUCTIONS
HYPERLIPIDEMIA:  Decrease intake of saturated fats, fast food, sweets.  Increase intake of fresh fruit fresh vegetables and lean meats.  Increase healthy fats seeds, nuts, olive oil instead of butter.  walk 150 minutes/week for heart health.  HYPERTENSION:   Decrease intake of processed foods, fast foods, lunch meat, canned soups, canned veggies.  Increase intake of fresh fruits, veggies, and lean meats. Monitor blood pressure at home, keep a log and bring this with you to your next appointment. Call the office if your blood pressure runs 150/90 or higher consistently.  Blood Pressure Technique:  Sit quietly in a chair for 5 minutes with back supported and feet on the floor  Then place left arm on a table or armrest so bicep area is at the same level of heart or left breast  Check three times in a row, disregard the highest reading and average the other two  Continue current medication.  Continue work on diet - recommend lots of fruits and vegetables, lean protein like chicken, turkey, fish, beans and Greek yogurt. Try to choose healthier carbohydrate options like oatmeal, wheat bread and pasta, sweet potatoes. Limit sugary treats.  Check a fasting sugar first thing in the AM once daily and keep a log of the results to bring to your next office visit.  Please contact office if your sugars are consistently >140.  Reevaluate in 3 months.   LAB Order/ BLOOD TESTS   I have ordered lab work for you to get done. This should be fasting. Nothing to eat or drink after midnight besides black tea,  black coffee, or water. If you do not hear from this office within two days of having your labs done, please call for your results.   I have ordered a bone density test to be done when you can.  We will call the results.  MAMMOGRAM:  I have ordered you a mammogram to be done as soon as you are able.  We will call the results.

## 2024-06-01 NOTE — CONSULTS
"Consults  History Of Present Illness:    Marissa Reyna is a 69 y.o. female presenting with severe shortness of breath as her predominant symptoms and minimal chest pressure that had been intermittent over few days prior to admission.  She had no previous history of myocardial infarction, heart failure, valvular heart disease, syncope or sustained arrhythmias that we were aware of.  She received her medical care through the [facility] system.  This was information received from the emergency room physician and staff.    Her EKG in the emergency room showed sinus tachycardia with a short DC interval, an incomplete right bundle branch block, criteria for left ventricular hypertrophy, and ST elevation in her inferior and apicolateral leads consistent with an acute STEMI.    She became rapidly unstable in the emergency room on a nonrebreather mask and was emergently intubated in the setting of abrupt PEA at which point CPR was started.  Her initial blood gas showed a pH of 7.01 with pCO2 of 47, pO2 of 202 99% O2 saturation.    Her initial troponin level was 52     Last Recorded Vitals:  Vitals:    06/01/24 1059 06/01/24 1107   BP: 105/81    Pulse: (!) 132    Resp: (!) 32 20   Weight: 130 kg (286 lb 9.6 oz)    Height: 1.651 m (5' 5\")        Last Labs:  CBC - 6/1/2024: 11:15 AM  20.1 12.0 339    40.2      CMP - 6/1/2024: 11:15 AM  9.1 7.1 24 --- 1.0   _ 4.1 17 134      PTT - No results in last year.  _   _ _     POC HEMOGLOBIN A1c   Date/Time Value Ref Range Status   12/13/2023 10:39 AM 5.7 4.2 - 6.5 % Final     Hemoglobin A1C   Date/Time Value Ref Range Status   05/29/2024 09:11 AM 5.3 See below % Final   05/04/2023 08:50 AM 5.4 4.0 - 6.0 % Final     Comment:     Hemoglobin A1C levels are related to mean blood glucose during the   preceding 2-3 months. The relationship table below may be used as a   general guide. Each 1% increase in HGB A1C is a reflection of an   increase in mean glucose of approximately 30 mg/dl.   " "Reference: Diabetes Care, volume 29, supplement 1 Jan. 2006                        HGB A1C ................. Approx. Mean Glucose   _______________________________________________   6%   ...............................  120 mg/dl   7%   ...............................  150 mg/dl   8%   ...............................  180 mg/dl   9%   ...............................  210 mg/dl   10%  ...............................  240 mg/dl  Performed at [address] [id number]     04/28/2022 08:21 AM 5.4 4.0 - 6.0 % Final     Comment:     Hemoglobin A1C levels are related to mean blood glucose during the   preceding 2-3 months. The relationship table below may be used as a   general guide. Each 1% increase in HGB A1C is a reflection of an   increase in mean glucose of approximately 30 mg/dl.   Reference: Diabetes Care, volume 29, supplement 1 Jan. 2006                        HGB A1C ................. Approx. Mean Glucose   _______________________________________________   6%   ...............................  120 mg/dl   7%   ...............................  150 mg/dl   8%   ...............................  180 mg/dl   9%   ...............................  210 mg/dl   10%  ...............................  240 mg/dl  Performed at [address] [address]       LDL Calculated   Date/Time Value Ref Range Status   05/29/2024 09:11 AM 67 65 - 130 mg/dL Final   05/04/2023 08:50 AM 77 65 - 130 MG/DL Final   04/28/2022 08:21 AM 70 65 - 130 MG/DL Final   09/02/2021 08:28 AM 60 (L) 65 - 130 MG/DL Final      Last I/O:  No intake/output data recorded.    Past Cardiology Tests (Last 3 Years):  EKG:  No results found for this or any previous visit from the past 1095 days.    Echo:  No results found for this or any previous visit from the past 1095 days.    Ejection Fractions:  No results found for: \"EF\"  Cath:  No results found for this or any previous visit from the past 1095 days.    Stress " Test:  No results found for this or any previous visit from the past 1095 days.    Cardiac Imaging:  No results found for this or any previous visit from the past 1095 days.      Past Medical History:  She has no past medical history on file.    Past Surgical History:  She has no past surgical history on file.      Social History:  She reports that she has never smoked. She has never used smokeless tobacco. She reports current alcohol use. She reports current drug use. Drug: Marijuana.    Family History:  Family History   Problem Relation Name Age of Onset    Hypertension Mother      Diabetes Father      Heart disease Father      No Known Problems Sister      No Known Problems Brother      No Known Problems Daughter      No Known Problems Son          Allergies:  Penicillin    Inpatient Medications:  Scheduled medications   Medication Dose Route Frequency    EPINEPHrine        heparin  5,000 Units intravenous Once    iohexol  75 mL intravenous Once in imaging    norepinephrine in dextrose 5 %        oxygen   inhalation Continuous - Inhalation     PRN medications   Medication    EPINEPHrine    norepinephrine in dextrose 5 %     Continuous Medications   Medication Dose Last Rate     Outpatient Medications:  Current Outpatient Medications   Medication Instructions    ARIPiprazole (ABILIFY) 2 mg, oral, Daily    atorvastatin (LIPITOR) 20 mg, oral, Daily    celecoxib (CeleBREX) 200 mg capsule TAKE 1 CAPSULE DAILY WITH FOOD AS NEEDED    DULoxetine (CYMBALTA) 60 mg, oral, 2 times daily    furosemide (LASIX) 20 mg, oral, Daily    glucosamine-chondroit-vit C-Mn (Glucosamine-Chondroitin Complx) capsule 1 capsule, oral, Daily    losartan (COZAAR) 100 mg, oral, Daily    metFORMIN XR (GLUCOPHAGE-XR) 500 mg, oral, 2 times daily    multivitamin (MULTIPLE VITAMINS ORAL) 1 tablet, oral, Daily    SUMAtriptan (IMITREX) 50 mg, oral, 2 times daily PRN    topiramate (TOPAMAX) 100 mg, oral, Daily    traZODone (Desyrel) 50 mg tablet TAKE 1  TABLET AT BEDTIME AS NEEDED FOR DEPRESSION    verapamil ER (VERALAN PM) 360 mg, oral, Every morning       Physical Exam:  At the time of  my arrival to the patient's bed in the [facility] emergency room the patient was intubated and undergoing CPR for PEA cardiac arrest.  ACLS protocol was being implemented.  During pulse checks the underlying rhythm appeared to be severe junctional bradycardia with no palpable pulse.  Bedside echocardiogram showed essentially asystole without ventricular contraction.  Her pupils were fixed and nonreactive.   Despite intravenous Levophed and ACLS protocol, after approximately 1 hour of CPR the patient was pronounced dead.     Assessment/Plan   1.  Inferolateral STEMI    2.  PEA cardiac arrest without a shockable rhythm, and no response to CPR/ACLS    3. Severe lactic acidosis    4.  Morbid obesity    5.  Chest x-ray showing mild cardiomegaly and mild pulmonary vascular congestion.      Peripheral IV 06/01/24 20 G Right Antecubital (Active)   Site Assessment Clean 06/01/24 1117   Line Status Lab draw;Blood return noted 06/01/24 1117   Number of days: 0       ETT  8 mm (Active)   Secured at (cm) 24 cm 06/01/24 1134   Measured from Lips 06/01/24 1134   Secured Location Center 06/01/24 1134   Secured by Commercial tube villarreal 06/01/24 1134   Site Condition Dry 06/01/24 1134   Number of days: 0       Code Status:  No Order    I spent 30 minutes in the professional and overall care of this patient.        Dylan Ward DO

## 2024-06-01 NOTE — ED NOTES
Pt became unresponsive, not following any commands, MD called to bedside. RT called to bedside.      Joleen Ernandez RN  06/01/24 [id number]

## 2024-06-01 NOTE — ED NOTES
Pt presents to ER for SOB, EMS called a STEMI from the Field. Upon arrival, Pt in resp distress, RT called to bedside. VS, 12 lead EKG, IV and lab draw. MD at bedside.      Joleen Ernandez RN  06/01/24 [id number]

## 2024-06-01 NOTE — ED NOTES
1143, Note continued from Full arrest.   1144- Pulse check, PEA, CPR resumed.   1145-Epi 1 mg IV> CPR.   1148- Pulse check, PEA, CPR  1149 Epi 1 mg IV.  1150 Sodium Bicarb 1 amp.  1151- Pulse check. PEA. Resume CPR.  1153-Epi 1 mg IV/Pulse check.   1154- Pulse present, MD using ultrasound.   1156/ HR 94.   1157 CPR in progress, no detectable pulse.   1158- Epi 1 mg IV.   1200 Pulse check, PEA.  CPR in progress.   1203- CPR,   1204/ Epi 1 mg. IV  1205- Pulse check, PEA. No pulse.   1207- CPR. Cardiology team at bedside.   1209-Epi 1 mg.  CPR.   1210- MD to update family.   1213- pulse check. PEA  Epi 1 mg IV. Family at   1215-Pulse check. TOD.            Joleen Ernandez RN  06/01/24 1150     Joleen Ernandez RN  06/01/24 1218

## 2024-06-01 NOTE — ED PROVIDER NOTES
HPI   No chief complaint on file.      Patient is a 69-year-old female who presented to the emergency department for evaluation of shortness of breath.  Patient history of cardiomegaly, hypertension, diabetes.  She states that she has been feeling short of breath for the last several days however has gotten significantly worse today.  EMS noted that when they arrived patient was having significant work of breathing.  They attempted giving her a DuoNeb breathing treatment placing her on nonrebreather and brought in for further evaluation.  Patient denies any chest pain.  She denies fever, chills, cough or congestion.  EMS noted on EKG that there was concern for possible myocardial infarction and called a code STEMI prior to arrival.      History provided by:  Patient and EMS personnel                      No data recorded                   Patient History   No past medical history on file.  No past surgical history on file.  Family History   Problem Relation Name Age of Onset    Hypertension Mother      Diabetes Father      Heart disease Father      No Known Problems Sister      No Known Problems Brother      No Known Problems Daughter      No Known Problems Son       Social History     Tobacco Use    Smoking status: Never    Smokeless tobacco: Never   Substance Use Topics    Alcohol use: Yes     Comment: ocassional    Drug use: Yes     Types: Marijuana     Comment: medical       Physical Exam   ED Triage Vitals   Temp Pulse Resp BP   -- -- -- --      SpO2 Temp src Heart Rate Source Patient Position   -- -- -- --      BP Location FiO2 (%)     -- --       Physical Exam  Vitals and nursing note reviewed.   Constitutional:       General: She is in acute distress.      Appearance: She is obese. She is ill-appearing.   Eyes:      Extraocular Movements: Extraocular movements intact.      Pupils: Pupils are equal, round, and reactive to light.   Cardiovascular:      Rate and Rhythm: Regular rhythm. Tachycardia present.    Pulmonary:      Effort: Tachypnea present.      Breath sounds: Decreased breath sounds and rhonchi (Rhonchi in the lower lobes bilaterally) present. No wheezing or rales.   Musculoskeletal:      Right lower leg: Edema (1+ pitting edema) present.      Left lower leg: Edema (1+ pitting edema) present.   Skin:     General: Skin is warm.      Coloration: Skin is pale.   Neurological:      General: No focal deficit present.      Mental Status: She is alert.       Recent Results (from the past 24 hour(s))   BLOOD GAS ARTERIAL FULL PANEL    Collection Time: 06/01/24 11:11 AM   Result Value Ref Range    POCT pH, Arterial 7.01 (LL) 7.38 - 7.42 pH    POCT pCO2, Arterial 47 (H) 38 - 42 mm Hg    POCT pO2, Arterial 202 (H) 85 - 95 mm Hg    POCT SO2, Arterial 99 94 - 100 %    POCT Oxy Hemoglobin, Arterial 97.2 94.0 - 98.0 %    POCT Hematocrit Calculated, Arterial 37.0 36.0 - 46.0 %    POCT Sodium, Arterial 137 136 - 145 mmol/L    POCT Potassium, Arterial 4.9 3.5 - 5.3 mmol/L    POCT Chloride, Arterial 106 98 - 107 mmol/L    POCT Ionized Calcium, Arterial 1.27 1.10 - 1.33 mmol/L    POCT Glucose, Arterial 481 (HH) 74 - 99 mg/dL    POCT Lactate, Arterial 12.0 (HH) 0.4 - 2.0 mmol/L    POCT Base Excess, Arterial -18.9 (L) -2.0 - 3.0 mmol/L    POCT HCO3 Calculated, Arterial 11.9 (L) 22.0 - 26.0 mmol/L    POCT Hemoglobin, Arterial 12.4 12.0 - 16.0 g/dL    POCT Anion Gap, Arterial 24 10 - 25 mmo/L    Patient Temperature 37.0 degrees Celsius    FiO2 100 %    Apparatus      Ipap CMH2O 15.0 cm H2O    Epap CMH2O 5.0 cm H2O    Site of Arterial Puncture Radial Right     Harjit's Test Positive    CBC and Auto Differential    Collection Time: 06/01/24 11:15 AM   Result Value Ref Range    WBC 20.1 (H) 4.4 - 11.3 x10*3/uL    nRBC 3.2 (H) 0.0 - 0.0 /100 WBCs    RBC 3.70 (L) 4.00 - 5.20 x10*6/uL    Hemoglobin 12.0 12.0 - 16.0 g/dL    Hematocrit 40.2 36.0 - 46.0 %     (H) 80 - 100 fL    MCH 32.4 26.0 - 34.0 pg    MCHC 29.9 (L) 32.0 - 36.0  g/dL    RDW 21.1 (H) 11.5 - 14.5 %    Platelets 339 150 - 450 x10*3/uL    Immature Granulocytes %, Automated 1.6 (H) 0.0 - 0.9 %    Immature Granulocytes Absolute, Automated 0.32 0.00 - 0.70 x10*3/uL   Comprehensive Metabolic Panel    Collection Time: 06/01/24 11:15 AM   Result Value Ref Range    Glucose 402 (H) 65 - 99 mg/dL    Sodium 140 133 - 145 mmol/L    Potassium 4.5 3.4 - 5.1 mmol/L    Chloride 104 97 - 107 mmol/L    Bicarbonate 15 (L) 24 - 31 mmol/L    Urea Nitrogen 20 8 - 25 mg/dL    Creatinine 1.40 0.40 - 1.60 mg/dL    eGFR 41 (L) >60 mL/min/1.73m*2    Calcium 9.1 8.5 - 10.4 mg/dL    Albumin 4.1 3.5 - 5.0 g/dL    Alkaline Phosphatase 134 (H) 35 - 125 U/L    Total Protein 7.1 5.9 - 7.9 g/dL    AST 24 5 - 40 U/L    Bilirubin, Total 1.0 0.1 - 1.2 mg/dL    ALT 17 5 - 40 U/L    Anion Gap >19 (H) <=19 mmol/L   NT Pro-BNP    Collection Time: 06/01/24 11:15 AM   Result Value Ref Range    PROBNP 12,137 (H) 0 - 353 pg/mL   Magnesium    Collection Time: 06/01/24 11:15 AM   Result Value Ref Range    Magnesium 2.60 1.60 - 3.10 mg/dL   Serial Troponin, Initial ([facility])    Collection Time: 06/01/24 11:15 AM   Result Value Ref Range    Troponin T, High Sensitivity 52 (HH) <=14 ng/L   Manual Differential    Collection Time: 06/01/24 11:15 AM   Result Value Ref Range    Neutrophils %, Manual 54.0 40.0 - 80.0 %    Lymphocytes %, Manual 32.0 13.0 - 44.0 %    Monocytes %, Manual 10.0 2.0 - 10.0 %    Eosinophils %, Manual 1.0 0.0 - 6.0 %    Basophils %, Manual 0.0 0.0 - 2.0 %    Atypical Lymphocytes %, Manual 2.0 0.0 - 2.0 %    Metamyelocytes %, Manual 1.0 0.0 - 0.0 %    Seg Neutrophils Absolute, Manual 10.85 (H) 1.20 - 7.00 x10*3/uL    Lymphocytes Absolute, Manual 6.43 (H) 1.20 - 4.80 x10*3/uL    Monocytes Absolute, Manual 2.01 (H) 0.10 - 1.00 x10*3/uL    Eosinophils Absolute, Manual 0.20 0.00 - 0.70 x10*3/uL    Basophils Absolute, Manual 0.00 0.00 - 0.10 x10*3/uL    Atypical Lymphs Absolute, Manual 0.40 0.00 - 0.50 x10*3/uL     Metamyelocytes Absolute, Manual 0.20 0.00 - 0.00 x10*3/uL    Total Cells Counted 100     Manual nRBC per 100 Cells 2.0 (H) 0.0 - 0.0 %    RBC Morphology See Below     Polychromasia Marked     Spherocytes Few     Ovalocytes Few     Lurdes Cells Few    POCT GLUCOSE    Collection Time: 06/01/24 11:20 AM   Result Value Ref Range    POCT Glucose 272 (H) 74 - 99 mg/dL       ED Course & MDM   Diagnoses as of 06/02/24 0622   ST elevation myocardial infarction (STEMI), unspecified artery (Multi)   Acute respiratory failure with hypoxia (Multi)   Cardiac arrest (Multi)       Medical Decision Making  Patient is a 69-year-old female that presented to the emergency room for evaluation of shortness of breath.  Patient tachypneic and in obvious respiratory distress on initial presentation.  Given her decreased breath sounds work of breathing attempted to put her on BiPAP to help with patient's respiratory distress and work of breathing.  EKG at this time and prehospital does show some possible ST elevation in the high lateral and inferior leads.  I discussed case with cardiologist who is requesting CT angio given patient is not having any chest pain.  If CT angio is negative for pulmonary embolism he will take patient to Cath Lab.  Blood work had been ordered including CBC, CMP, troponin, BNP.  Immediately after talking with cardiology went back to evaluate patient and attempt to get her to CT scan however patient became unresponsive and started to become more bradycardic.  Repeat EKG at that time did show significant ST elevation consistent with a STEMI however patient lost pulses.  CPR was initiated and patient was intubated as described in procedure note.  Multiple rounds of CPR were performed patient was given epinephrine, bicarb.  We did briefly get pulses back very faint and there was slight cardiac movement on ultrasound however patient went back into PEA with no movement on ultrasound seen and CPR was again initiated.  CPR  was continued for a total of 50 minutes and patient had changed to asystole.  We had given heparin and cardiologist Dr. Ward did come down to bedside and assisted with CPR.  Patient had received aspirin, Brilinta and nitro in the field by EMS prior to arrival.  Given the prolonged downtime with no return of spontaneous circulation at this time time of death was called at 1215.  Preliminary cause of death is respiratory arrest and STEMI based on patient's EKG.  I did inform patient's  who was at bedside.  I discussed case with 's office and they released the patient's body as this is felt to be medical with no signs of foul play.  Patient's primary care physician was notified and will sign death certificate.    CRITICAL CARE NOTE:   Upon my evaluation, this patient had a high probability of imminent or life-threatening deterioration due to STEMI, respiratory arrest, which required my direct attention, intervention, and personal management    32 total minutes of critical care were personally provided which excludes all other billable procedures. This was for time at the bedside, re-evaluations, interpretation of lab and imaging results, discussions with consultants, and monitoring for potential decompensation. Intervention were performed as documented above.        Procedure  Intubation    Performed by: Caleb Aj DO  Authorized by: Caleb Aj DO    Consent:     Consent obtained:  Emergent situation  Pre-procedure details:     Indications: airway protection and cardio/pulmonary arrest      Patient status:  Unresponsive    Look externally: no concerns      Neck mobility: normal      Pharmacologic strategy: DSI      Induction agents:  None    Paralytics:  None  Procedure details:     Preoxygenation:  Bag valve mask    CPR in progress: yes      Number of attempts:  1  Successful intubation attempt details:     Intubation method:  Oral    Intubation technique: video assisted      Laryngoscope  blade:  Mac 3    Bougie used: no      Grade view: I      Tube size (mm):  8.0    Tube type:  Cuffed    Tube visualized through cords: yes    Placement assessment:     ETT at teeth/gumline (cm):  22    Tube secured with:  ETT villarreal    Breath sounds:  Equal and absent over the epigastrium    Placement verification: chest rise, tube exhalation and waveform ETCO2      Chest x-ray findings: Unable to perform chest x-ray as patient did not have return of spontaneous circulation.  Post-procedure details:     Procedure completion:  Tolerated       Caleb Aj,   06/02/24 [id number]

## 2024-06-01 NOTE — ED NOTES
1127- Pt had no pulse, CPR started.   1128 EPI 1 mg  1129- Intubated with 8 fr, 23 at lip. Color change positive on ECO2. Bilat breath sounds.   1129- Pulse check. No pulse, PEA. CPR resumed.  1131- SodiumBicarb. I amp. IV  1132 Epi 1 mg IV  1133  Pulse check, PEA, resume CPR  1136 Pulse check, Pulse present, HR 95  1138- Heparin 5000 units given IV.  1138-Epi 10 mcgs. IV.  1139* resuming  CPR.  1141- Pulse check, PEA, CPR resumed.   1142- EPI 1 mg. IV             Joleen Ernandez, SALLY  06/01/24 1143

## 2024-06-03 LAB
ATRIAL RATE: 136 BPM
P OFFSET: 187 MS
P ONSET: 158 MS
PR INTERVAL: 82 MS
Q ONSET: 199 MS
QRS COUNT: 22 BEATS
QRS DURATION: 110 MS
QT INTERVAL: 324 MS
QTC CALCULATION(BAZETT): 487 MS
QTC FREDERICIA: 425 MS
R AXIS: -17 DEGREES
T AXIS: 108 DEGREES
T OFFSET: 361 MS
VENTRICULAR RATE: 136 BPM

## 2024-06-05 ENCOUNTER — APPOINTMENT (OUTPATIENT)
Dept: CARDIOLOGY | Facility: CLINIC | Age: 69
End: 2024-06-05
Payer: MEDICARE

## 2024-07-08 ENCOUNTER — APPOINTMENT (OUTPATIENT)
Dept: CARDIOLOGY | Facility: CLINIC | Age: 69
End: 2024-07-08
Payer: MEDICARE

## 2024-08-22 ENCOUNTER — APPOINTMENT (OUTPATIENT)
Dept: PRIMARY CARE | Facility: CLINIC | Age: 69
End: 2024-08-22
Payer: MEDICARE

## 2024-08-27 ENCOUNTER — APPOINTMENT (OUTPATIENT)
Dept: OTHER | Facility: CLINIC | Age: 69
End: 2024-08-27
Payer: MEDICARE

## 2024-09-06 ENCOUNTER — APPOINTMENT (OUTPATIENT)
Dept: OTHER | Facility: CLINIC | Age: 69
End: 2024-09-06
Payer: MEDICARE